# Patient Record
Sex: MALE | Race: WHITE | NOT HISPANIC OR LATINO | URBAN - METROPOLITAN AREA
[De-identification: names, ages, dates, MRNs, and addresses within clinical notes are randomized per-mention and may not be internally consistent; named-entity substitution may affect disease eponyms.]

---

## 2021-06-28 NOTE — PATIENT DISCUSSION
"""Follow ERM w/o surgery. Call if vision decreases or distortion increases. Recommend regular Amsler checks.  ""."

## 2021-06-30 NOTE — PATIENT DISCUSSION
Discussed ocular health, eyeglass prescription given. Patient instructed to call office immediately if sudden changes in vision occur.

## 2022-12-05 ENCOUNTER — CONSULTATION/EVALUATION (OUTPATIENT)
Dept: URBAN - METROPOLITAN AREA CLINIC 4 | Facility: CLINIC | Age: 22
End: 2022-12-05

## 2022-12-05 PROCEDURE — 92133 CPTRZD OPH DX IMG PST SGM ON: CPT

## 2022-12-05 PROCEDURE — 92250 FUNDUS PHOTOGRAPHY W/I&R: CPT

## 2022-12-05 PROCEDURE — 99244 OFF/OP CNSLTJ NEW/EST MOD 40: CPT

## 2022-12-05 ASSESSMENT — TONOMETRY
OS_IOP_MMHG: 18
OD_IOP_MMHG: 20

## 2022-12-05 ASSESSMENT — VISUAL ACUITY
OS_CC: 20/20
OD_CC: 20/20

## 2022-12-05 ASSESSMENT — KERATOMETRY
OS_K2POWER_DIOPTERS: 42.75
OD_K2POWER_DIOPTERS: 42.00
OD_AXISANGLE_DEGREES: 003
OS_K1POWER_DIOPTERS: 41.25
OD_AXISANGLE2_DEGREES: 93
OD_K1POWER_DIOPTERS: 41.50
OS_AXISANGLE_DEGREES: 178
OS_AXISANGLE2_DEGREES: 88

## 2023-04-25 ENCOUNTER — POST-OP (OUTPATIENT)
Dept: URBAN - METROPOLITAN AREA CLINIC 14 | Facility: CLINIC | Age: 23
End: 2023-04-25

## 2023-04-25 DIAGNOSIS — Z98.890: ICD-10-CM

## 2023-04-25 PROCEDURE — 99024LK POST OP LASIK CARE ONLY

## 2023-04-25 ASSESSMENT — KERATOMETRY
OD_AXISANGLE2_DEGREES: 97
OS_AXISANGLE_DEGREES: 53
OD_AXISANGLE_DEGREES: 7
OD_K1POWER_DIOPTERS: 41.75
OS_K1POWER_DIOPTERS: 41.50
OS_K2POWER_DIOPTERS: 42.00
OS_AXISANGLE2_DEGREES: 143
OD_K2POWER_DIOPTERS: 42.00

## 2023-04-25 ASSESSMENT — VISUAL ACUITY
OU_SC: 20/15
OD_SC: 20/15
OS_SC: 20/15

## 2023-06-26 ENCOUNTER — POST-OP (OUTPATIENT)
Dept: URBAN - METROPOLITAN AREA CLINIC 14 | Facility: CLINIC | Age: 23
End: 2023-06-26

## 2023-06-26 DIAGNOSIS — Z98.890: ICD-10-CM

## 2023-06-26 PROCEDURE — 99024LK POST OP LASIK CARE ONLY

## 2023-06-26 ASSESSMENT — KERATOMETRY
OD_AXISANGLE_DEGREES: 7
OD_AXISANGLE2_DEGREES: 97
OD_K1POWER_DIOPTERS: 41.75
OS_K1POWER_DIOPTERS: 41.50
OS_AXISANGLE2_DEGREES: 143
OD_K2POWER_DIOPTERS: 42.00
OS_K2POWER_DIOPTERS: 42.00
OS_AXISANGLE_DEGREES: 53

## 2023-06-26 ASSESSMENT — VISUAL ACUITY
OS_SC: 20/15-1
OD_SC: 20/15-1

## 2023-09-26 ENCOUNTER — POST-OP (OUTPATIENT)
Dept: URBAN - METROPOLITAN AREA CLINIC 14 | Facility: CLINIC | Age: 23
End: 2023-09-26

## 2023-09-26 DIAGNOSIS — Z98.890: ICD-10-CM

## 2023-09-26 PROCEDURE — 99024LK POST OP LASIK CARE ONLY

## 2023-09-26 ASSESSMENT — KERATOMETRY
OD_AXISANGLE_DEGREES: 7
OS_AXISANGLE2_DEGREES: 143
OS_K1POWER_DIOPTERS: 41.50
OD_K1POWER_DIOPTERS: 41.75
OS_K2POWER_DIOPTERS: 42.00
OD_K2POWER_DIOPTERS: 42.00
OD_AXISANGLE2_DEGREES: 97
OS_AXISANGLE_DEGREES: 53

## 2023-09-26 ASSESSMENT — VISUAL ACUITY
OU_SC: 20/15
OS_SC: 20/15
OD_SC: 20/15